# Patient Record
Sex: FEMALE | Race: WHITE | Employment: FULL TIME | URBAN - NONMETROPOLITAN AREA
[De-identification: names, ages, dates, MRNs, and addresses within clinical notes are randomized per-mention and may not be internally consistent; named-entity substitution may affect disease eponyms.]

---

## 2017-11-16 ENCOUNTER — HOSPITAL ENCOUNTER (EMERGENCY)
Age: 37
Discharge: HOME | End: 2017-11-16
Attending: EMERGENCY MEDICINE

## 2017-11-16 ENCOUNTER — APPOINTMENT (OUTPATIENT)
Dept: GENERAL RADIOLOGY | Age: 37
End: 2017-11-16

## 2017-11-16 VITALS
HEART RATE: 78 BPM | BODY MASS INDEX: 29.45 KG/M2 | HEIGHT: 60 IN | RESPIRATION RATE: 18 BRPM | DIASTOLIC BLOOD PRESSURE: 78 MMHG | SYSTOLIC BLOOD PRESSURE: 128 MMHG | OXYGEN SATURATION: 98 % | WEIGHT: 150 LBS | TEMPERATURE: 97.5 F

## 2017-11-16 DIAGNOSIS — R07.89 CHEST WALL PAIN: Primary | ICD-10-CM

## 2017-11-16 LAB
EKG ATRIAL RATE: 94 BPM
EKG P AXIS: 46 DEGREES
EKG P-R INTERVAL: 154 MS
EKG Q-T INTERVAL: 370 MS
EKG QRS DURATION: 64 MS
EKG QTC CALCULATION (BAZETT): 462 MS
EKG R AXIS: 14 DEGREES
EKG T AXIS: 16 DEGREES
EKG VENTRICULAR RATE: 94 BPM

## 2017-11-16 PROCEDURE — 71101 X-RAY EXAM UNILAT RIBS/CHEST: CPT

## 2017-11-16 PROCEDURE — 96372 THER/PROPH/DIAG INJ SC/IM: CPT

## 2017-11-16 PROCEDURE — 6360000002 HC RX W HCPCS: Performed by: EMERGENCY MEDICINE

## 2017-11-16 PROCEDURE — 93005 ELECTROCARDIOGRAM TRACING: CPT

## 2017-11-16 PROCEDURE — 99283 EMERGENCY DEPT VISIT LOW MDM: CPT

## 2017-11-16 RX ORDER — OXYCODONE HYDROCHLORIDE AND ACETAMINOPHEN 5; 325 MG/1; MG/1
1-2 TABLET ORAL EVERY 6 HOURS PRN
Qty: 10 TABLET | Refills: 0 | Status: SHIPPED | OUTPATIENT
Start: 2017-11-16 | End: 2017-11-23

## 2017-11-16 RX ORDER — KETOROLAC TROMETHAMINE 30 MG/ML
30 INJECTION, SOLUTION INTRAMUSCULAR; INTRAVENOUS ONCE
Status: COMPLETED | OUTPATIENT
Start: 2017-11-16 | End: 2017-11-16

## 2017-11-16 RX ADMIN — KETOROLAC TROMETHAMINE 30 MG: 30 INJECTION, SOLUTION INTRAMUSCULAR at 05:32

## 2017-11-16 ASSESSMENT — ENCOUNTER SYMPTOMS
ABDOMINAL DISTENTION: 0
VOMITING: 0
SHORTNESS OF BREATH: 0
BACK PAIN: 0
NAUSEA: 0
COUGH: 0

## 2017-11-16 ASSESSMENT — PAIN DESCRIPTION - LOCATION: LOCATION: RIB CAGE

## 2017-11-16 ASSESSMENT — PAIN DESCRIPTION - FREQUENCY: FREQUENCY: CONTINUOUS

## 2017-11-16 ASSESSMENT — PAIN SCALES - GENERAL
PAINLEVEL_OUTOF10: 7
PAINLEVEL_OUTOF10: 7
PAINLEVEL_OUTOF10: 4

## 2017-11-16 ASSESSMENT — PAIN DESCRIPTION - DESCRIPTORS: DESCRIPTORS: SHARP

## 2017-11-16 ASSESSMENT — PAIN DESCRIPTION - ORIENTATION: ORIENTATION: LEFT;POSTERIOR

## 2017-11-16 ASSESSMENT — PAIN SCALES - WONG BAKER: WONGBAKER_NUMERICALRESPONSE: 0

## 2017-11-16 ASSESSMENT — PAIN DESCRIPTION - PAIN TYPE: TYPE: ACUTE PAIN

## 2017-11-16 NOTE — ED PROVIDER NOTES
[facility] ED  [place] [address]  Phone: [phone]  eMERGENCY dEPARTMENT eNCOUnter      Pt Name: Julia Mccullough  MRN: [id number]  [place] 1980  Date of evaluation: 11/16/17      CHIEF COMPLAINT       Chief Complaint   Patient presents with    Rib Pain     c/o pain to left side ribs and leftm posterior ribs. increases with palpation and deep breath. denies injury. states had \"flu\" last week and was coughing alot. HISTORY OF PRESENT ILLNESS    Julia Mccullough is a 31 y.o. female who presents Today with complaints of left-sided rib pain. The patient states that she had the \"flu\" last week and was coughing quite a bit. She did not seek evaluation at this time. She states that she had some discomfort to the ribs that has gotten worse over the last couple of days. She states that she has some pain when she takes a deep breath. This is preventing her from working. She has not sought any intervention for this yet. She is a smoker. She denies taking any hormones no recent immobilization or surgery or hospitalization no history of blood clot. She does not have any anterior chest pain or pressure. She does not have any abdominal pain or nausea vomiting. She states that it hurts to laugh cough and take a deep breath and worse with movement. She states that her cough has essentially resolved. REVIEW OF SYSTEMS     Review of Systems   Constitutional: Negative for fever. HENT: Negative for congestion. Respiratory: Negative for cough and shortness of breath. Cardiovascular:        C/O rib pain. Gastrointestinal: Negative for abdominal distention, nausea and vomiting. Musculoskeletal: Negative for back pain. Skin: Negative for rash. Neurological: Negative for syncope. All other systems reviewed and are negative. PAST MEDICAL HISTORY    has a past medical history of Overdose.     SURGICAL HISTORY      has no past surgical history on file.    CURRENT MEDICATIONS       Discharge Medication List as of 11/16/2017  6:16 AM          ALLERGIES     is allergic to penicillins and aspirin. FAMILY HISTORY     has no family status information on file. family history is not on file. SOCIAL HISTORY      reports that she has been smoking Cigarettes. She has been smoking about 0.50 packs per day. She has never used smokeless tobacco. She reports that she drinks alcohol. She reports that she does not use drugs. PHYSICAL EXAM     INITIAL VITALS:  height is 5' (1.524 m) and weight is 150 lb (68 kg). Her tympanic temperature is 97.5 °F (36.4 °C). Her blood pressure is 128/78 and her pulse is 78. Her respiration is 18 and oxygen saturation is 98%. Physical Exam   Constitutional: She is oriented to person, place, and time. She appears well-developed and well-nourished. No distress. HENT:   Head: Normocephalic and atraumatic. Mouth/Throat: Oropharynx is clear and moist.   Eyes: EOM are normal. Pupils are equal, round, and reactive to light. Cardiovascular: Normal rate, regular rhythm, normal heart sounds and intact distal pulses. No murmur heard. Pulmonary/Chest: Effort normal and breath sounds normal. No respiratory distress. She has no wheezes. She has no rales. She exhibits tenderness. Abdominal: Soft. There is no tenderness. There is no rebound and no guarding. Musculoskeletal: Normal range of motion. She exhibits no edema or tenderness. Neurological: She is alert and oriented to person, place, and time. No cranial nerve deficit. Skin: Skin is warm and dry. No rash noted. She is not diaphoretic. Psychiatric: She has a normal mood and affect. Her behavior is normal.   Vitals reviewed. DIFFERENTIAL DIAGNOSIS / MDM / EMERGENCY DEPARTMENT COURSE:     Plan for EKG and left rib series with a chest x-ray. The patient has negative score for both per Modified well's for pulmonary embolism.   She doesn't have any significant cardiovascular risk factors except for smoking. The patient was agreeable to Toradol her allergy to aspirin is that it causes stomachache and nausea sometimes vomiting. She tolerates ibuprofen without difficulty. She felt much improved after the Toradol was no longer visibly in pain. I do not feel that any further workup is needed at this time I will give her an inspiratory spirometer to reduce the risk of pneumonia she should continue NSAIDs primarily I will give her some Percocet for breakthrough pain. I did check her [facility] narcotics report and there were no controlled prescriptions reported. DIAGNOSTIC RESULTS     EKG: All EKG's are interpreted by the Emergency Department Physician who either signs or Co-signs this chart in the absence of a cardiologist.    Troponin EKG shows a sinus rhythm at 94 bpm.  Normal intervals and axis. No acute ST elevations depressions or T-wave inversions. The patient is nonacute twelve-lead EKG. RADIOLOGY:   I directly visualized the following plain film images and reviewed the radiologist interpretations of radiologic studies:    Xr Ribs Left Include Chest (min 3 Views)    Result Date: 11/16/2017  EXAMINATION: LEFT RIB SERIES WITH FRONTAL VIEW OF THE CHEST 11/16/2017 5:36 am COMPARISON: None. HISTORY: ORDERING SYSTEM PROVIDED HISTORY: rib pain TECHNOLOGIST PROVIDED HISTORY: Reason for exam:->rib pain Ordering Physician Provided Reason for Exam: Cough for the last couple weeks, now has mid left posterior rib pain, smoker Acuity: Acute Type of Exam: Initial FINDINGS: The lungs are clear there is no infiltrate, effusion or pneumothorax noted be present. The cardiac silhouette appears unremarkable. The ribs appear intact with no evidence for fracture. There is no evidence for underlying pleural reaction. No acute cardiopulmonary process seen and no evidence for rib fracture. LABS:  No results found for this visit on 11/16/17.       EMERGENCY DEPARTMENT COURSE: